# Patient Record
Sex: MALE | Race: WHITE
[De-identification: names, ages, dates, MRNs, and addresses within clinical notes are randomized per-mention and may not be internally consistent; named-entity substitution may affect disease eponyms.]

---

## 2018-12-10 ENCOUNTER — HOSPITAL ENCOUNTER (EMERGENCY)
Dept: HOSPITAL 25 - UCEAST | Age: 28
Discharge: HOME | End: 2018-12-10
Payer: COMMERCIAL

## 2018-12-10 VITALS — DIASTOLIC BLOOD PRESSURE: 69 MMHG | SYSTOLIC BLOOD PRESSURE: 129 MMHG

## 2018-12-10 DIAGNOSIS — J32.9: Primary | ICD-10-CM

## 2018-12-10 PROCEDURE — G0463 HOSPITAL OUTPT CLINIC VISIT: HCPCS

## 2018-12-10 PROCEDURE — 99202 OFFICE O/P NEW SF 15 MIN: CPT

## 2019-05-07 NOTE — UC
Throat Pain/Nasal Philippe HPI





- HPI Summary


HPI Summary: 


28-year-old male who comes in today with a chief complaint of nasal congestion 

and rhinorrhea for 3 weeks.  The worst congestion is in the left nostril.  he's 

tried decongestants and they have not helped.  No recent fevers.  He does have 

pressure in the left side of the frontal sinuses.





- History of Current Complaint


Chief Complaint: UCRespiratory


Stated Complaint: SINUS CONGESTION


Time Seen by Provider: 12/10/18 14:02


Pain Intensity: 2





- Allergies/Home Medications


Allergies/Adverse Reactions: 


 Allergies











Allergy/AdvReac Type Severity Reaction Status Date / Time


 


nickel Allergy  Rash Verified 12/10/18 13:52











Home Medications: 


 Home Medications





Oxymetazoline HCl [Nasal Spray] 1 spray INH ONCE PRN 12/10/18 [History 

Confirmed 12/10/18]











PMH/Surg Hx/FS Hx/Imm Hx


Previously Healthy: Yes





- Surgical History


Surgical History: None





- Family History


Known Family History: Positive: Non-Contributory





- Social History


Alcohol Use: Occasionally


Substance Use Type: None


Smoking Status (MU): Never Smoked Tobacco





Review of Systems


All Other Systems Reviewed And Are Negative: Yes


Constitutional: Positive: Negative


Skin: Positive: Negative


Eyes: Positive: Blurred Vision - LEFT EYE WITH INCREASED SINUS CONGESTION


ENT: Positive: Sore Throat, Nasal Discharge, Sinus Congestion, Sinus Pain/

Tenderness


Respiratory: Positive: Negative


Cardiovascular: Positive: Negative


Gastrointestinal: Positive: Negative


Neurovascular: Positive: Negative


Musculoskeletal: Positive: Negative


Neurological: Positive: Negative


Psychological: Positive: Negative


Is Patient Immunocompromised?: No





Physical Exam


Triage Information Reviewed: Yes


Appearance: No Pain Distress, Well-Nourished, Ill-Appearing - MILD


Vital Signs: 


 Initial Vital Signs











Temp  98.1 F   12/10/18 13:48


 


Pulse  62   12/10/18 13:48


 


Resp  18   12/10/18 13:48


 


BP  129/69   12/10/18 13:48


 


Pulse Ox  99   12/10/18 13:48











Vital Signs Reviewed: Yes


Eye Exam: Normal


Eyes: Positive: Conjunctiva Clear


ENT: Positive: Pharyngeal erythema, Nasal congestion, Nasal drainage, TMs normal

, Uvula midline


Neck exam: Normal


Neck: Positive: Supple


Respiratory: Positive: Lungs clear, Normal breath sounds, No respiratory 

distress


Cardiovascular Exam: Normal


Cardiovascular: Positive: RRR


Musculoskeletal Exam: Normal


Musculoskeletal: Positive: Strength Intact, ROM Intact


Neurological Exam: Normal


Neurological: Positive: Alert, Muscle Tone Normal


Psychological Exam: Normal


Psychological: Positive: Age Appropriate Behavior


Skin Exam: Normal





Throat Pain/Nasal Course/Dx





- Differential Dx/Diagnosis


Provider Diagnosis: 


 Sinusitis








Discharge





- Sign-Out/Discharge


Documenting (check all that apply): Patient Departure


All imaging exams completed and their final reports reviewed: No Studies





- Discharge Plan


Condition: Stable


Disposition: HOME


Prescriptions: 


Amoxicillin/Clavulanate TAB* [Augmentin *] 875 mg PO BID #20 tab


Fluticasone NASAL SPRAY 50MCG* [Flonase NASAL SPRAY 50MCG*] 2 spray BOTH NARES 

DAILY #1 btl


Patient Education Materials:  Sinusitis (ED)


Referrals: 


[facility] [place] PHYSICIAN REFERRAL [Outside]


Additional Instructions: 


FOLLOW UP WITH YOUR DOCTOR IF NOT COMPLETELY IMPROVED.


GET RECHECKED FOR ANY WORSENING OF YOUR CONDITION OR QUESTIONS OR CONCERNS.





- Billing Disposition and Condition


Condition: STABLE


Disposition: Home
none

## 2020-03-05 ENCOUNTER — HOSPITAL ENCOUNTER (EMERGENCY)
Dept: HOSPITAL 25 - UCEAST | Age: 30
Discharge: HOME | End: 2020-03-05
Payer: COMMERCIAL

## 2020-03-05 VITALS — SYSTOLIC BLOOD PRESSURE: 113 MMHG | DIASTOLIC BLOOD PRESSURE: 64 MMHG

## 2020-03-05 DIAGNOSIS — J45.901: Primary | ICD-10-CM

## 2020-03-05 DIAGNOSIS — Z91.048: ICD-10-CM

## 2020-03-05 PROCEDURE — G0463 HOSPITAL OUTPT CLINIC VISIT: HCPCS

## 2020-03-05 PROCEDURE — 99212 OFFICE O/P EST SF 10 MIN: CPT

## 2020-03-05 PROCEDURE — 71046 X-RAY EXAM CHEST 2 VIEWS: CPT

## 2020-03-05 NOTE — UC
Respiratory Complaint HPI





- HPI Summary


HPI Summary: 





30 y/o male presents to the urgent care c/o productive cough for the past 11 

days w/o any improvement despite taking OTC medications.  Pt reports symptoms 

started w/ sinus congestion, nasal discharge and PND.  He has PMHX of asthma 

and this morning he developed mild SOB and mild wheezing.  His albuterol inhaler


has . He states for the past 3 years since he arrived to [place] , his 

asthma has been acting up a least 2/year.  Pt reports he was in [place] w/ her 

family about weeks ago , and after he returned he developed the URI symptoms. 

Pt is producing a green phlegm at times.  last night he experienced chills, but 

he denies fever.  He also denies dizziness, HA, chest pain, abdominal pain, N/V/

D.  





- History of Current Complaint


Chief Complaint: UCRespiratory


Stated Complaint: COUGH X 11 DAYS


Time Seen by Provider: 20 14:08


Hx Obtained From: Patient


Onset/Duration: Gradual Onset, Lasting Weeks - 2 weeks, Still Present, Worse 

Since - 2 days w/ wheezing


Timing: Intermittent Episodes


Severity Initially: Mild


Severity Currently: Moderate


Pain Intensity: 0


Pain Scale Used: 0-10 Numeric


Character: Cough: Productive, Sputum Description: - yellowish


Aggravating Factors: Recumbent Position


Alleviating Factors: Bronchodilator, OTC Meds


Associated Signs And Symptoms: Positive: Chills, Wheezing, URI, Nasal 

Congestion - yellowish, Sinus Discomfort.  Negative: Fever, Dizziness





- Risk Factors


Pulmonary Embolism Risk Factors: Negative


Cardiac Risk Factors: Negative


Pseudomonas Risk Factors: Negative


Tuberculosis Risk Factors: Negative





- Allergies/Home Medications


Allergies/Adverse Reactions: 


 Allergies











Allergy/AdvReac Type Severity Reaction Status Date / Time


 


nickel Allergy  Rash Verified 20 13:29











Home Medications: 


 Home Medications





Albuterol HFA INHALER* [Ventolin HFA Inhaler*] 2 puff INH BID PRN #1 mdi  [Rx]


DOXYcycline CAP(*) [DOXYcycline 100MG CAP(*)] 100 mg PO BID #20 cap 20 [Rx

]


predniSONE 20 mg TAB [Deltasone 20 MG TAB*] 20 mg PO DAILY #8 tab 20 [Rx]











PMH/Surg Hx/FS Hx/Imm Hx


Previously Healthy: Yes


Respiratory History: Asthma





- Surgical History


Surgical History: None





- Family History


Known Family History: Positive: Hypertension, Diabetes





- Social History


Occupation: Employed Full-time


Lives: With Family


Alcohol Use: Occasionally


Substance Use Type: None


Smoking Status (MU): Never Smoked Tobacco





Review of Systems


All Other Systems Reviewed And Are Negative: Yes


Constitutional: Positive: Chills, Fatigue


Skin: Positive: Negative


Eyes: Positive: Negative


ENT: Positive: Nasal Discharge - yellowish, Sinus Congestion, Other - PND


Respiratory: Positive: Shortness Of Breath - mild, Cough - productive cough w/ 

yellowish phlegm x 11 days, Other - wheezing


Cardiovascular: Positive: Negative


Gastrointestinal: Positive: Negative


Genitourinary: Positive: Negative


Motor: Positive: Negative


Neurovascular: Positive: Negative


Musculoskeletal: Positive: Myalgia


Neurological/Mental Status: Positive: Negative


Psychological: Positive: Negative


Is Patient Immunocompromised?: No





Physical Exam





- Summary


Physical Exam Summary: 





Vital Signs Reviewed: Yes


General: well developed, well nourished male sitting in the examining table w/o 

any apparent distress


Eyes: Positive: Conjunctiva Clear - PERRLA, EOMI, fundi grossly normal


ENT: Positive: Normal ENT inspection, Hearing grossly normal, Pharynx normal, 

Nasal congestion - edematous and erythematous nasal mucosa, Nasal drainage - 

yellowish drainage, TMs normal.  Negative: Tonsillar swelling, Tonsillar exudate


Neck: Positive: Supple, Nontender, No Lymphadenopathy


Respiratory: no orthopnea or dyspnea.  Able to speak in full sentences, no 

retractions or accessory muscle use, no tripod position, stridor, or head 

bobbing.  Positive breath sounds bilaterally. diffuse scattered  wheezing and 

rhonchi on b/L lungs, no crackles or rales.


Cardiovascular: Positive: RRR, No Murmur, Pulses Normal, Brisk Capillary Refill


Abdomen Description: Positive: Nontender, No Organomegaly, Soft.  Negative: CVA 

Tenderness (R), CVA Tenderness (L)


Bowel Sounds: Positive: Present


Musculoskeletal Exam: Normal


Musculoskeletal: Positive: Strength Intact, ROM Intact, No Edema


Neurological Exam: Normal


Psychological Exam: Normal


Skin Exam: Normal





Triage Information Reviewed: Yes


Vital Signs: 


 Initial Vital Signs











Temp  98.8 F   20 13:26


 


Pulse  58   20 13:26


 


Resp  18   20 13:26


 


BP  113/64   20 13:26


 


Pulse Ox  99   20 13:26














Respiratory Course/Dx





- Course


Course Of Treatment: 





30 y/o male presents to the urgent care c/o productive cough for the past 11 

days w/o any improvement despite taking OTC medications.  Pt reports symptoms 

started w/ sinus congestion, nasal discharge and PND.  He has PMHX of asthma 

and this morning he developed mild SOB and mild wheezing.  His albuterol inhaler


has . He states for the past 3 years since he arrived to [place] , his 

asthma has been acting up a least 2/year.  Pt reports he was in [place] w/ her 

family about weeks ago , and after he returned he developed the URI symptoms. 

Pt is producing a green phlegm at times.  last night he experienced chills, but 

he denies fever.  He also denies dizziness, HA, chest pain, abdominal pain, N/V/

D.  Hx obtained.  Pt w/  scattered wheezes on bilaterally lungs, and  rhonchi, 

good air entry B/L on examination.  O2Sat:99%. Pt w/ asthma exacerbation due to 

bronchtis. Pt given Prednisone PO and Duoneb Treatment to alleviate symptoms. 

Pt tolerated well treatment and lungs improved,and wheezing resolved.  Patient 

prescribed Doxyxycline  PO, Prednisone taper dose, Albuterol neb. to alleviate 

symptoms  as directed below.  The patient was recommended to increase fluid 

intake. Take medications as recommended. Pt advised to return to the clinic or f

/u w/ his PCP if symptoms do not improve. All D/C instructions explained.  

Patient understood and agree w/ plan of care. Pt left clinic hemodynamically 

stable , A&OX3








- Differential Dx/Diagnosis


Differential Diagnosis/HQI/PQRI: Asthma, Bronchitis, Influenza, Laryngitis, 

Lower Resp Infection, Sinusitis, Other - pneumonia


Provider Diagnosis: 


 Asthma exacerbation, Bronchitis








Discharge ED





- Sign-Out/Discharge


Documenting (check all that apply): Patient Departure - D/c home


All imaging exams completed and their final reports reviewed: Yes





- Discharge Plan


Condition: Stable


Disposition: HOME


Prescriptions: 


Albuterol HFA INHALER* [Ventolin HFA Inhaler*] 2 puff INH BID PRN #1 mdi


 PRN Reason: Wheezing


DOXYcycline CAP(*) [DOXYcycline 100MG CAP(*)] 100 mg PO BID #20 cap


predniSONE 20 mg TAB [Deltasone 20 MG TAB*] 20 mg PO DAILY #8 tab


Patient Education Materials:  Asthma (ED), Acute Bronchitis (ED)


Referrals: 


[facility] [place] PHYSICIAN REFERRAL [Outside] - 3 Days


Additional Instructions: 


1-Please take full course of antibiotic to avoid resistance. Take yogurts w/ 

probiotics or culturelle to protect your GI system


2-Take Mucinex PO  tabs as directed and continue using your  albuterol inhaler w

/ chamber  to alleviate cough and wheezing.  Increase fluid intake, rest and 

eat well. 


3- Take Prednisone PO as directed starting tomorrow. First dose given today 


3- If symptoms do not improve or worsen or your develop SOB with fever and 

severe wheezing please go immediately to the ER further evaluation and 

treatment.


4- F/u with your PCP in 3 days if not improvement for further management on 

your Asthma








- Billing Disposition and Condition


Condition: STABLE


Disposition: Home